# Patient Record
Sex: MALE | Race: WHITE | NOT HISPANIC OR LATINO | URBAN - METROPOLITAN AREA
[De-identification: names, ages, dates, MRNs, and addresses within clinical notes are randomized per-mention and may not be internally consistent; named-entity substitution may affect disease eponyms.]

---

## 2017-12-04 ENCOUNTER — FOLLOW UP ESTABLISHED (OUTPATIENT)
Dept: URBAN - METROPOLITAN AREA CLINIC 60 | Facility: CLINIC | Age: 72
End: 2017-12-04
Payer: MEDICARE

## 2017-12-04 DIAGNOSIS — H25.13 AGE-RELATED NUCLEAR CATARACT, BILATERAL: Primary | ICD-10-CM

## 2017-12-04 DIAGNOSIS — H02.831 DERMATOCHALASIS OF RIGHT UPPER LID: ICD-10-CM

## 2017-12-04 DIAGNOSIS — H53.021 REFRACTIVE AMBLYOPIA, RIGHT EYE: ICD-10-CM

## 2017-12-04 DIAGNOSIS — H02.834 DERMATOCHALASIS OF LEFT UPPER LID: ICD-10-CM

## 2017-12-04 PROCEDURE — 92014 COMPRE OPH EXAM EST PT 1/>: CPT | Performed by: OPTOMETRIST

## 2017-12-04 PROCEDURE — 92310 CONTACT LENS FITTING OU: CPT | Performed by: OPTOMETRIST

## 2017-12-04 PROCEDURE — 92015 DETERMINE REFRACTIVE STATE: CPT | Performed by: OPTOMETRIST

## 2017-12-04 ASSESSMENT — VISUAL ACUITY
OD: 20/50
OS: 20/20

## 2017-12-04 ASSESSMENT — INTRAOCULAR PRESSURE
OS: 14
OD: 14

## 2018-12-05 ENCOUNTER — FOLLOW UP ESTABLISHED (OUTPATIENT)
Dept: URBAN - METROPOLITAN AREA CLINIC 60 | Facility: CLINIC | Age: 73
End: 2018-12-05
Payer: COMMERCIAL

## 2018-12-05 DIAGNOSIS — H18.59 OTHER HEREDITARY CORNEAL DYSTROPHIES: ICD-10-CM

## 2018-12-05 DIAGNOSIS — H04.123 TEAR FILM INSUFFICIENCY OF BILATERAL LACRIMAL GLANDS: ICD-10-CM

## 2018-12-05 DIAGNOSIS — H52.4 PRESBYOPIA: ICD-10-CM

## 2018-12-05 PROCEDURE — 92014 COMPRE OPH EXAM EST PT 1/>: CPT | Performed by: OPTOMETRIST

## 2018-12-05 PROCEDURE — 92310 CONTACT LENS FITTING OU: CPT | Performed by: OPTOMETRIST

## 2018-12-05 ASSESSMENT — INTRAOCULAR PRESSURE
OD: 14
OS: 14

## 2018-12-05 ASSESSMENT — VISUAL ACUITY
OD: 20/50
OS: 20/20

## 2019-12-30 ENCOUNTER — FOLLOW UP ESTABLISHED (OUTPATIENT)
Dept: URBAN - METROPOLITAN AREA CLINIC 60 | Facility: CLINIC | Age: 74
End: 2019-12-30
Payer: COMMERCIAL

## 2019-12-30 DIAGNOSIS — H52.13 MYOPIA, BILATERAL: ICD-10-CM

## 2019-12-30 PROCEDURE — 92310 CONTACT LENS FITTING OU: CPT | Performed by: OPTOMETRIST

## 2019-12-30 PROCEDURE — 92014 COMPRE OPH EXAM EST PT 1/>: CPT | Performed by: OPTOMETRIST

## 2019-12-30 PROCEDURE — 92025 CPTRIZED CORNEAL TOPOGRAPHY: CPT | Performed by: OPTOMETRIST

## 2019-12-30 RX ORDER — ERYTHROMYCIN 5 MG/G
OINTMENT OPHTHALMIC
Qty: 1 | Refills: 0 | Status: INACTIVE
Start: 2019-12-30 | End: 2021-03-26

## 2019-12-30 ASSESSMENT — INTRAOCULAR PRESSURE
OD: 16
OS: 16

## 2019-12-30 ASSESSMENT — VISUAL ACUITY
OS: 20/30
OD: 20/50

## 2020-12-29 ENCOUNTER — FOLLOW UP ESTABLISHED (OUTPATIENT)
Dept: URBAN - METROPOLITAN AREA CLINIC 60 | Facility: CLINIC | Age: 75
End: 2020-12-29
Payer: COMMERCIAL

## 2020-12-29 PROCEDURE — 92014 COMPRE OPH EXAM EST PT 1/>: CPT | Performed by: OPTOMETRIST

## 2020-12-29 ASSESSMENT — INTRAOCULAR PRESSURE
OD: 18
OS: 18

## 2020-12-29 ASSESSMENT — VISUAL ACUITY
OD: 20/40
OS: 20/20

## 2021-03-26 ENCOUNTER — NEW PATIENT (OUTPATIENT)
Dept: URBAN - METROPOLITAN AREA CLINIC 60 | Facility: CLINIC | Age: 76
End: 2021-03-26
Payer: COMMERCIAL

## 2021-03-26 DIAGNOSIS — G43.B0 OPHTHALMOPLEGIC MIGRAINE, NOT INTRACTABLE: ICD-10-CM

## 2021-03-26 DIAGNOSIS — H25.813 COMBINED FORMS OF AGE-RELATED CATARACT, BILATERAL: ICD-10-CM

## 2021-03-26 DIAGNOSIS — H43.392 OTHER VITREOUS OPACITIES, LEFT EYE: Primary | ICD-10-CM

## 2021-03-26 PROCEDURE — 99204 OFFICE O/P NEW MOD 45 MIN: CPT | Performed by: OPHTHALMOLOGY

## 2021-03-26 ASSESSMENT — INTRAOCULAR PRESSURE
OS: 15
OD: 15

## 2021-12-30 ENCOUNTER — OFFICE VISIT (OUTPATIENT)
Dept: URBAN - METROPOLITAN AREA CLINIC 59 | Facility: CLINIC | Age: 76
End: 2021-12-30
Payer: COMMERCIAL

## 2021-12-30 DIAGNOSIS — H18.593 OTHER HEREDITARY CORNEAL DYSTROPHIES: ICD-10-CM

## 2021-12-30 DIAGNOSIS — H43.812 VITREOUS DEGENERATION, LEFT EYE: ICD-10-CM

## 2021-12-30 PROCEDURE — 92014 COMPRE OPH EXAM EST PT 1/>: CPT | Performed by: OPTOMETRIST

## 2021-12-30 ASSESSMENT — VISUAL ACUITY
OD: 20/50
OS: 20/25

## 2021-12-30 ASSESSMENT — INTRAOCULAR PRESSURE
OD: 14
OS: 16

## 2021-12-30 NOTE — IMPRESSION/PLAN
Impression: Combined forms of age-related cataract, bilateral: H25.813. Plan: Discussed diagnosis of cataracts with patient. Patient has no significant visual complaints at this time and is able to perform all their daily activities. We will re-evaluate cataract on return visit unless patient notes any changes sooner. Educated patient on holding off on cataract surgery at this time.

## 2021-12-30 NOTE — IMPRESSION/PLAN
Impression: Presbyopia: H52.4. Plan: New glasses Rx was not  given today. Will consider to bring down prescription in right eye to even out eyes. Patient educated that it could cause a bit of blurriness.

## 2021-12-30 NOTE — IMPRESSION/PLAN
Impression: Refractive amblyopia, right eye Plan: Educated patient in detail about glasses would not make vision perfect in  both eyes due to very different refractions in each eye. Patient states that he feels more comfortable driving with out glasses due to the double vision. Discussed anisometropia and secondary asthenopia. When patient ready for new distance glasses we will just correct astigmatism and not hyperopia OD to lesson the magnitude of anisometropia.

## 2021-12-30 NOTE — IMPRESSION/PLAN
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 Plan: Symptoms of retinal detachment or tears were discussed in detail. If patient notices any symptoms discussed, contact office ASAP.

## 2022-12-30 ENCOUNTER — OFFICE VISIT (OUTPATIENT)
Dept: URBAN - METROPOLITAN AREA CLINIC 59 | Facility: CLINIC | Age: 77
End: 2022-12-30
Payer: COMMERCIAL

## 2022-12-30 DIAGNOSIS — H18.593 OTHER HEREDITARY CORNEAL DYSTROPHIES: ICD-10-CM

## 2022-12-30 DIAGNOSIS — H53.021 REFRACTIVE AMBLYOPIA, RIGHT EYE: ICD-10-CM

## 2022-12-30 DIAGNOSIS — H25.813 COMBINED FORMS OF AGE-RELATED CATARACT, BILATERAL: Primary | ICD-10-CM

## 2022-12-30 DIAGNOSIS — H43.812 VITREOUS DEGENERATION, LEFT EYE: ICD-10-CM

## 2022-12-30 PROCEDURE — 99214 OFFICE O/P EST MOD 30 MIN: CPT | Performed by: OPTOMETRIST

## 2022-12-30 ASSESSMENT — VISUAL ACUITY
OD: 20/50
OS: 20/30

## 2022-12-30 ASSESSMENT — INTRAOCULAR PRESSURE
OD: 15
OS: 15

## 2022-12-30 NOTE — IMPRESSION/PLAN
Impression: Vitreous degeneration, left eye: A04.109. Plan: Patient mentioned that Dr. Eliseo Hunt discussed possible vitreolysis OS after cataract surgery.

## 2022-12-30 NOTE — IMPRESSION/PLAN
Impression: Refractive amblyopia, right eye Plan: Educated patient in detail about glasses would not make vision perfect in  both eyes due to very different refractions in each eye. Patient states that he feels more comfortable driving with out glasses due to the double vision. Discussed anisometropia and secondary asthenopia. Hold off on glasses at this time, patient wishes to proceed with cataract surgery.

## 2022-12-30 NOTE — IMPRESSION/PLAN
Impression: Combined forms of age-related cataract, bilateral: H25.813. Plan: Cataract accounts for patient's complaints. Discussed all risks, benefits, procedures and recovery. Patient desires to have surgery, recommend CE w/IOL. Recommend surgery OU. Ascan needed. RTC for surgeon consult. Patient understands that cataract surgery will have a guarded prognosis OD due to amblyopia but discussed that IOLs could improve the anisometropia.

## 2023-01-27 ENCOUNTER — OFFICE VISIT (OUTPATIENT)
Dept: URBAN - METROPOLITAN AREA CLINIC 60 | Facility: CLINIC | Age: 78
End: 2023-01-27
Payer: COMMERCIAL

## 2023-01-27 DIAGNOSIS — H25.813 COMBINED FORMS OF AGE-RELATED CATARACT, BILATERAL: Primary | ICD-10-CM

## 2023-01-27 DIAGNOSIS — H43.812 VITREOUS DEGENERATION, LEFT EYE: ICD-10-CM

## 2023-01-27 DIAGNOSIS — H53.021 REFRACTIVE AMBLYOPIA, RIGHT EYE: ICD-10-CM

## 2023-01-27 DIAGNOSIS — H18.593 OTHER HEREDITARY CORNEAL DYSTROPHIES: ICD-10-CM

## 2023-01-27 PROCEDURE — 99214 OFFICE O/P EST MOD 30 MIN: CPT | Performed by: OPHTHALMOLOGY

## 2023-01-27 ASSESSMENT — KERATOMETRY
OD: 42.32
OS: 42.48

## 2023-01-27 ASSESSMENT — INTRAOCULAR PRESSURE
OD: 16
OS: 17

## 2023-01-27 NOTE — IMPRESSION/PLAN
Impression: Combined forms of age-related cataract, bilateral: H25.813. Plan: Cataract accounts for pt complaints. Pt desires sx. Schedule CE/IOL both eyes, right then left. Risk/Benefits/Alternatives discussed with patient. Rec. mono-focal vs Toric. Consider ORA/LRI. RL2. Target distance. Order a-scan. Will likely need cataract surgery in the other eye due to anisometropia. Patient is a candidate for Shawanda Walker. Discussed R/B/A. Recommend Ofloxacin or Tobramycin QID for 1 week postop. Intra-cameral Moxifloxacin to decrease the risk of infection. May elect to use combination drops or generics per patient preference. Guarded prognosis OD due to Amblyopia.

## 2023-01-27 NOTE — IMPRESSION/PLAN
Impression: Vitreous degeneration, left eye: H43.812. Plan: Posterior vitreous detachment accounts for the patient's complaints. There is no evidence of retinal pathology. All signs and risks of retinal detachment and tears were discussed in detail. Patient instructed to call the office immediately if any symptoms noted. Consider possible Vitreolysis OS after cataract surgery if symptoms worsen.